# Patient Record
Sex: MALE | URBAN - METROPOLITAN AREA
[De-identification: names, ages, dates, MRNs, and addresses within clinical notes are randomized per-mention and may not be internally consistent; named-entity substitution may affect disease eponyms.]

---

## 2020-04-25 ENCOUNTER — VIRTUAL VISIT (OUTPATIENT)
Dept: FAMILY MEDICINE | Facility: OTHER | Age: 30
End: 2020-04-25

## 2020-04-26 NOTE — PROGRESS NOTES
"Date: 2020 16:00:30  Clinician: Susan Alves  Clinician NPI: [id number]  Patient: Matthew Pryor  Patient : 1990  Patient Address: [address] [place] [address] [place] [address]  Patient Phone: [phone]  Visit Protocol: URI  Patient Summary:  Matthew is a 29 year old ( : 1990 ) male who initiated a Visit for COVID-19 (Coronavirus) evaluation and screening. When asked the question \"Please sign me up to receive news, health information and promotions from [employer].\", Matthew responded \"No\".    Matthew states his symptoms started 1-2 days ago.   His symptoms consist of facial pain or pressure, a sore throat, nasal congestion, malaise, myalgia, and a headache. Matthew also feels feverish.   Symptom details     Nasal secretions: The color of his mucus is clear, white, and yellow.    Sore throat: Matthew reports having mild throat pain (1-3 on a 10 point pain scale), does not have exudate on his tonsils, and can swallow liquids. He is not sure if the lymph nodes in his neck are enlarged. A rash has not appeared on the skin since the sore throat started.     Temperature: His current temperature is 97.4 degrees Fahrenheit.     Facial pain or pressure: The facial pain or pressure feels worse when bending over or leaning forward.     Headache: He states the headache is mild (1-3 on a 10 point pain scale).      Matthew denies having cough, ear pain, rhinitis, wheezing, chills, vomiting, nausea, teeth pain, ageusia, anosmia, and diarrhea. He also denies having recent facial or sinus surgery in the past 60 days. He is not experiencing dyspnea.   Precipitating events  Matthew is not sure if he has been exposed to someone with strep throat. He has not recently been exposed to someone with influenza. Matthew has been in close contact with the following high risk individuals: adults 65 or older, people with asthma, heart disease or diabetes, and immunocompromised people.   Pertinent COVID-19 " (Coronavirus) information  Matthew has not traveled internationally or to the areas where COVID-19 (Coronavirus) is widespread, including cruise ship travel in the last 14 days before the start of his symptoms.   Matthew does not work or volunteer as healthcare worker or a  and does not work or volunteer in a healthcare facility.   He lives with a healthcare worker.   Matthew has not had a close contact with a laboratory-confirmed COVID-19 patient within 14 days of symptom onset. He also has not had a close contact with a suspected COVID-19 patient within 14 days of symptom onset.   Triage Point(s) temporarily suspended for COVID-19 (Coronavirus) screening  Matthew reported the following symptoms which were previously protocol referral points. These protocol referral points have temporarily been removed for purposes of COVID-19 (Coronavirus) screening.   More than 4 sinus infections in the past year   Pertinent medical history  Matthew had 4 or more sinus infections within the past year.   Matthew has taken an antibiotic medication in the past month. Antibiotic details as reported by the patient (free text): Levofloxacin-Sinus Infection   Matthew needs a return to work/school note.   Weight: 260 lbs   Matthew does not smoke or use smokeless tobacco.   Weight: 260 lbs  A synchronous phone visit was initiated by the provider for the following reason: recurrent sinusitis    MEDICATIONS: omeprazole-sodium bicarbonate oral, Allegra-D 24 Hour oral, Vitamin D3 oral, buspirone oral, ALLERGIES: NKDA  Clinician Response:  Dear Matthew,  Based on the information you have provided, you have Allergic Rhinitis commonly called hay fever or seasonal allergy.  Medication information  I am prescribing:     Azelastine 137 mcg (0.1 %) nasal aerosol, spray. Inhale 1-2 sprays in each nostril 2 times per day. There are no refills with this prescription.   Self care  Steps you can take to be as comfortable as possible:      Take a warm shower to loosen congestion    Use a cool-mist humidifier.    Use throat lozenges.    Suck on frozen items such as popsicles.    Drink hot tea with lemon and honey.    Gargle with warm salt water (1/4 teaspoon of salt per 8 ounce glass of water).    Avoid substances you are allergic to.    Try to reduce the amount of humidity in your living and working environments.    Consider moving pets outside of your living and/or working area.    You can decrease your allergy symptoms by staying indoors as much as possible until your allergy season is over.     When to seek care  Please be seen in a clinic or urgent care if any of the following occur:   New symptoms develop, or symptoms become worse   Call ahead before going to the clinic or urgent care.  Call 911 or go to the emergency room if you feel that your throat is closing off, you suddenly develop a rash, you are unable to swallow fluids, you are drooling, or you are having difficulty breathing.  COVID-19 (Coronavirus) General Information  Because there is currently no vaccine to prevent infection, the best way to protect yourself is to avoid being exposed to this virus. Common symptoms of COVID-19 include but are not limited to fever, cough, and shortness of breath. These symptoms appear 2-14 days after you are exposed to the virus that causes COVID-19. Click here for more information from the CDC on how to protect yourself.  If you are sick with COVID-19 or suspect you are infected with the virus that causes COVID-19, follow the steps here from the CDC to help prevent the disease from spreading to people in your home and community.  Click here for general information from the CDC on testing.  If you develop any of these emergency warning signs for COVID-19, get medical attention immediately:     Trouble breathing    Persistent pain or pressure in the chest    New confusion or inability to arouse    Bluish lips or face      Call your doctor or clinic  before going in. Call 911 if you have a medical emergency and notify the  you have or think you may have COVID-19.  For more detailed and up to date information on COVID-19 (Coronavirus), please visit the CDC website.   Diagnosis: Allergic Rhinitis  Diagnosis ICD: J30.9  Triage Notes: I reviewed the patient's history, verified their identity, and explained the Visit process.    history of bad spring allergies.  has stuffiness and sore throat, no respiratory / breathing symptoms-  discussed that this sounds like allergies, not Covid 19.  had sinus surgery delayed due to Covid 19 -  has had repeated sinusitis- had not been responding to amox or augmentin, last treated with levofloxacin.  not aware of past treatment with cefdinir or clindamycin  Synchronous Triage: phone, status: completed, duration: 604 seconds  Prescription: azelastine 137 mcg (0.1 %) nasal aerosol,spray 1 200 spray squeeze bottle, 30 days supply. Inhale 2 sprays in each nostril 2 times per day. Refills: 0, Refill as needed: no, Allow substitutions: yes

## 2024-09-23 ENCOUNTER — HOSPITAL ENCOUNTER (EMERGENCY)
Facility: CLINIC | Age: 34
Discharge: HOME | End: 2024-09-24
Attending: EMERGENCY MEDICINE | Admitting: EMERGENCY MEDICINE
Payer: COMMERCIAL

## 2024-09-23 ENCOUNTER — APPOINTMENT (OUTPATIENT)
Dept: CT IMAGING | Facility: CLINIC | Age: 34
End: 2024-09-23
Attending: EMERGENCY MEDICINE
Payer: COMMERCIAL

## 2024-09-23 DIAGNOSIS — R55 SYNCOPE, UNSPECIFIED SYNCOPE TYPE: ICD-10-CM

## 2024-09-23 LAB
ANION GAP SERPL CALCULATED.3IONS-SCNC: 16 MMOL/L (ref 7–15)
ATRIAL RATE - MUSE: 69 BPM
BASOPHILS # BLD AUTO: 0 10E3/UL (ref 0–0.2)
BASOPHILS NFR BLD AUTO: 0 %
BUN SERPL-MCNC: 16.6 MG/DL (ref 6–20)
CALCIUM SERPL-MCNC: 9.2 MG/DL (ref 8.8–10.4)
CHLORIDE SERPL-SCNC: 101 MMOL/L (ref 98–107)
CREAT SERPL-MCNC: 0.95 MG/DL (ref 0.67–1.17)
D DIMER PPP FEU-MCNC: 1.18 UG/ML FEU (ref 0–0.5)
DIASTOLIC BLOOD PRESSURE - MUSE: NORMAL MMHG
EGFRCR SERPLBLD CKD-EPI 2021: >90 ML/MIN/1.73M2
EOSINOPHIL # BLD AUTO: 0 10E3/UL (ref 0–0.7)
EOSINOPHIL NFR BLD AUTO: 1 %
ERYTHROCYTE [DISTWIDTH] IN BLOOD BY AUTOMATED COUNT: 15.4 % (ref 10–15)
GLUCOSE SERPL-MCNC: 105 MG/DL (ref 70–99)
HCO3 SERPL-SCNC: 25 MMOL/L (ref 22–29)
HCT VFR BLD AUTO: 44.2 % (ref 40–53)
HGB BLD-MCNC: 14.7 G/DL (ref 13.3–17.7)
HOLD SPECIMEN: NORMAL
IMM GRANULOCYTES # BLD: 0 10E3/UL
IMM GRANULOCYTES NFR BLD: 0 %
INTERPRETATION ECG - MUSE: NORMAL
LYMPHOCYTES # BLD AUTO: 1.6 10E3/UL (ref 0.8–5.3)
LYMPHOCYTES NFR BLD AUTO: 23 %
MAGNESIUM SERPL-MCNC: 2.1 MG/DL (ref 1.7–2.3)
MCH RBC QN AUTO: 27.2 PG (ref 26.5–33)
MCHC RBC AUTO-ENTMCNC: 33.3 G/DL (ref 31.5–36.5)
MCV RBC AUTO: 82 FL (ref 78–100)
MONOCYTES # BLD AUTO: 0.4 10E3/UL (ref 0–1.3)
MONOCYTES NFR BLD AUTO: 6 %
NEUTROPHILS # BLD AUTO: 4.9 10E3/UL (ref 1.6–8.3)
NEUTROPHILS NFR BLD AUTO: 70 %
NRBC # BLD AUTO: 0 10E3/UL
NRBC BLD AUTO-RTO: 0 /100
P AXIS - MUSE: 60 DEGREES
PLATELET # BLD AUTO: 178 10E3/UL (ref 150–450)
POTASSIUM SERPL-SCNC: 5.1 MMOL/L (ref 3.4–5.3)
PR INTERVAL - MUSE: 182 MS
QRS DURATION - MUSE: 110 MS
QT - MUSE: 364 MS
QTC - MUSE: 390 MS
R AXIS - MUSE: 17 DEGREES
RBC # BLD AUTO: 5.4 10E6/UL (ref 4.4–5.9)
SODIUM SERPL-SCNC: 142 MMOL/L (ref 135–145)
SYSTOLIC BLOOD PRESSURE - MUSE: NORMAL MMHG
T AXIS - MUSE: 50 DEGREES
TROPONIN T SERPL HS-MCNC: 7 NG/L
VENTRICULAR RATE- MUSE: 69 BPM
WBC # BLD AUTO: 6.9 10E3/UL (ref 4–11)

## 2024-09-23 PROCEDURE — 93005 ELECTROCARDIOGRAM TRACING: CPT | Performed by: EMERGENCY MEDICINE

## 2024-09-23 PROCEDURE — 99285 EMERGENCY DEPT VISIT HI MDM: CPT | Mod: 25

## 2024-09-23 PROCEDURE — 83735 ASSAY OF MAGNESIUM: CPT | Performed by: FAMILY MEDICINE

## 2024-09-23 PROCEDURE — 36415 COLL VENOUS BLD VENIPUNCTURE: CPT | Performed by: EMERGENCY MEDICINE

## 2024-09-23 PROCEDURE — 80048 BASIC METABOLIC PNL TOTAL CA: CPT | Performed by: FAMILY MEDICINE

## 2024-09-23 PROCEDURE — 84484 ASSAY OF TROPONIN QUANT: CPT | Performed by: FAMILY MEDICINE

## 2024-09-23 PROCEDURE — 250N000011 HC RX IP 250 OP 636: Performed by: EMERGENCY MEDICINE

## 2024-09-23 PROCEDURE — 70450 CT HEAD/BRAIN W/O DYE: CPT

## 2024-09-23 PROCEDURE — 71275 CT ANGIOGRAPHY CHEST: CPT

## 2024-09-23 PROCEDURE — 85379 FIBRIN DEGRADATION QUANT: CPT | Performed by: EMERGENCY MEDICINE

## 2024-09-23 PROCEDURE — 85025 COMPLETE CBC W/AUTO DIFF WBC: CPT | Performed by: FAMILY MEDICINE

## 2024-09-23 RX ORDER — IOPAMIDOL 755 MG/ML
90 INJECTION, SOLUTION INTRAVASCULAR ONCE
Status: COMPLETED | OUTPATIENT
Start: 2024-09-24 | End: 2024-09-23

## 2024-09-23 RX ADMIN — IOPAMIDOL 90 ML: 755 INJECTION, SOLUTION INTRAVENOUS at 23:59

## 2024-09-23 ASSESSMENT — COLUMBIA-SUICIDE SEVERITY RATING SCALE - C-SSRS
1. IN THE PAST MONTH, HAVE YOU WISHED YOU WERE DEAD OR WISHED YOU COULD GO TO SLEEP AND NOT WAKE UP?: NO
2. HAVE YOU ACTUALLY HAD ANY THOUGHTS OF KILLING YOURSELF IN THE PAST MONTH?: NO
6. HAVE YOU EVER DONE ANYTHING, STARTED TO DO ANYTHING, OR PREPARED TO DO ANYTHING TO END YOUR LIFE?: NO

## 2024-09-24 ENCOUNTER — ORDERS ONLY (AUTO-RELEASED) (OUTPATIENT)
Dept: CARDIOLOGY | Facility: CLINIC | Age: 34
End: 2024-09-24

## 2024-09-24 ENCOUNTER — OFFICE VISIT (OUTPATIENT)
Dept: CARDIOLOGY | Facility: CLINIC | Age: 34
End: 2024-09-24
Attending: EMERGENCY MEDICINE
Payer: COMMERCIAL

## 2024-09-24 VITALS
WEIGHT: 272 LBS | DIASTOLIC BLOOD PRESSURE: 68 MMHG | OXYGEN SATURATION: 97 % | TEMPERATURE: 97.8 F | SYSTOLIC BLOOD PRESSURE: 113 MMHG | HEART RATE: 62 BPM | HEIGHT: 65 IN | RESPIRATION RATE: 15 BRPM | BODY MASS INDEX: 45.32 KG/M2

## 2024-09-24 VITALS
SYSTOLIC BLOOD PRESSURE: 117 MMHG | RESPIRATION RATE: 14 BRPM | BODY MASS INDEX: 45.26 KG/M2 | HEART RATE: 73 BPM | DIASTOLIC BLOOD PRESSURE: 78 MMHG | WEIGHT: 272 LBS

## 2024-09-24 DIAGNOSIS — R06.09 EXERTIONAL DYSPNEA: ICD-10-CM

## 2024-09-24 DIAGNOSIS — E66.01 CLASS 3 SEVERE OBESITY DUE TO EXCESS CALORIES WITHOUT SERIOUS COMORBIDITY WITH BODY MASS INDEX (BMI) OF 45.0 TO 49.9 IN ADULT (H): ICD-10-CM

## 2024-09-24 DIAGNOSIS — R55 SYNCOPE AND COLLAPSE: ICD-10-CM

## 2024-09-24 DIAGNOSIS — R55 SYNCOPE AND COLLAPSE: Primary | ICD-10-CM

## 2024-09-24 DIAGNOSIS — E66.813 CLASS 3 SEVERE OBESITY DUE TO EXCESS CALORIES WITHOUT SERIOUS COMORBIDITY WITH BODY MASS INDEX (BMI) OF 45.0 TO 49.9 IN ADULT (H): ICD-10-CM

## 2024-09-24 DIAGNOSIS — R00.2 PALPITATIONS: ICD-10-CM

## 2024-09-24 LAB — TROPONIN T SERPL HS-MCNC: 7 NG/L

## 2024-09-24 PROCEDURE — G2211 COMPLEX E/M VISIT ADD ON: HCPCS | Performed by: INTERNAL MEDICINE

## 2024-09-24 PROCEDURE — 36415 COLL VENOUS BLD VENIPUNCTURE: CPT | Performed by: FAMILY MEDICINE

## 2024-09-24 PROCEDURE — 84484 ASSAY OF TROPONIN QUANT: CPT | Performed by: FAMILY MEDICINE

## 2024-09-24 PROCEDURE — 99204 OFFICE O/P NEW MOD 45 MIN: CPT | Performed by: INTERNAL MEDICINE

## 2024-09-24 RX ORDER — PREDNISONE 20 MG/1
TABLET ORAL
COMMUNITY
Start: 2024-09-17

## 2024-09-24 ASSESSMENT — ACTIVITIES OF DAILY LIVING (ADL): ADLS_ACUITY_SCORE: 35

## 2024-09-24 NOTE — PATIENT INSTRUCTIONS
Please contact direct nurses line Monday through Friday 8 AM to 5 PM @ [phone]    After-hours contact cardiology office at [phone].    Plan:    Will contact with results of testing  Keep well hydrated  Good luck with coaching season.

## 2024-09-24 NOTE — PROGRESS NOTES
HEART CARE ENCOUNTER CONSULTATON NOTE      [facility]  [phone]      Assessment/Recommendations   Assessment:   Syncope with collapse. LOC 30 seconds.   Obesity, BMI 45  Exertional dyspnea  Palpitations    Plan:  Recommend complete echocardiogram given syncope and dyspnea exertion  Recommend 14-day Zio patch monitor given syncopal episode, palpitations  Recommend treadmill ECG stress test given syncope and dyspnea exertion.         History of Present Illness/Subjective    HPI: Stevenson Sheehan is a 34 year old male no family history of sudden cardiac death who presents to cardiology clinic in consultation for syncope.    Patient is experienced 2 syncopal episodes in his lifetime.  Both were while he was standing.     Yesterday presented to the emergency department after a syncopal episode.  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  He states he was upset while unlocking the and returned back to his car.  When he returned to his car he had to get out of the car again felt lightheaded followed by a syncopal episode which lasted approximately 30 seconds.  He did not lose any control of his bowel or bladder.  No evidence of seizure.  When he returned to consciousness he was alert.  He felt a little rundown for next couple hours.  Present to the emergency department.  There was noted that he is in sinus rhythm.  Workup was negative for cardiac causes.    Patient did undergo cardiac MRI in 2018 which demonstrated mildly reduced right ventricle at 48%.  Otherwise results of his MRI were otherwise normal.  He had no scarring in the myocardium.  This was for performed at an outside hospital reviewed chart from the patient    Patient denies any lightheadedness episodes while sitting.  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  Denies any anginal chest pain while at work.  He denies orthopnea or PND  symptoms.    Does occasionally feel palpitations for a few beats.  No sustained rapid palpitations    No recent echocardiogram.  No history of stress testing.         Physical Examination  Review of Systems   Vitals: /78 (BP Location: Right arm, Patient Position: Sitting, Cuff Size: Adult Large)   Pulse 73   Resp 14   Wt 123.4 kg (272 lb)   BMI 45.26 kg/m    BMI= Body mass index is 45.26 kg/m .  Wt Readings from Last 3 Encounters:   09/24/24 123.4 kg (272 lb)   09/23/24 123.4 kg (272 lb)        Pleasant, obese   ENT/Mouth: membranes moist, no oral lesions or bleeding gums.      EYES:  no scleral icterus, normal conjunctivae       Chest/Lungs:   lungs are clear to auscultation, no rales or wheezing, no sternal scar, equal chest wall expansion    Cardiovascular:   Regular. Normal first and second heart sounds with no murmurs, rubs, or gallops; the carotid, radial and posterior tibial pulses are intact, Jugular venous pressure normal, no edema bilaterally    Abdomen:  no tenderness; bowel sounds are present   Extremities: no cyanosis or clubbing   Skin: no xanthelasma, warm.    Neurologic: normal  bilateral, no tremors     Psychiatric: alert and oriented x3, calm        Please refer above for cardiac ROS details.        Medical History  Surgical History Family History Social History   No past medical history on file.  No past surgical history on file.  No family history on file.     Social History     Socioeconomic History    Marital status:      Spouse name: Not on file    Number of children: Not on file    Years of education: Not on file    Highest education level: Not on file   Occupational History    Not on file   Tobacco Use    Smoking status: Never     Passive exposure: Past    Smokeless tobacco: Never   Substance and Sexual Activity    Alcohol use: Not Currently    Drug use: Never    Sexual activity: Not on file   Other Topics Concern    Not on file   Social History Narrative    Not on file  "    Social Determinants of Health     Financial Resource Strain: Not on file   Food Insecurity: Not on file   Transportation Needs: Not on file   Physical Activity: Not on file   Stress: Not on file   Social Connections: Not on file   Interpersonal Safety: Not on file   Housing Stability: Not on file           Medications  Allergies   Current Outpatient Medications   Medication Sig Dispense Refill    predniSONE (DELTASONE) 20 MG tablet TAKE 1 TABLET BY MOUTH 1 TO 2 TIMES DAILY WITH A MEAL FOR 5 DAYS         Allergies   Allergen Reactions    Cats     Dust Mites     Dogs Itching and Rash    Trees Rash          Lab Results    Chemistry/lipid CBC Cardiac Enzymes/BNP/TSH/INR   No results for input(s): \"CHOL\", \"HDL\", \"LDL\", \"TRIG\", \"CHOLHDLRATIO\" in the last 84253 hours.  No results for input(s): \"LDL\" in the last 14875 hours.  Recent Labs   Lab Test 09/23/24 2213      POTASSIUM 5.1   CHLORIDE 101   CO2 25   *   BUN 16.6   CR 0.95   GFRESTIMATED >90   LEON 9.2     Recent Labs   Lab Test 09/23/24 2213   CR 0.95     No results for input(s): \"A1C\" in the last 01664 hours.       Recent Labs   Lab Test 09/23/24 2213   WBC 6.9   HGB 14.7   HCT 44.2   MCV 82        Recent Labs   Lab Test 09/23/24 2213   HGB 14.7    No results for input(s): \"TROPONINI\" in the last 51910 hours.  No results for input(s): \"BNP\", \"NTBNPI\", \"NTBNP\" in the last 33887 hours.  No results for input(s): \"TSH\" in the last 43200 hours.  No results for input(s): \"INR\" in the last 72772 hours.     Alfredito Sequeira DO  Cardiologist     52 minutes spent by me on the date of the encounter doing chart review, history and exam, documentation and further activities per the note    The longitudinal plan of care for the diagnosis(es)/condition(s) as documented were addressed during this visit. Due to the added complexity in care, I will continue to support Stevenson in the subsequent management and with ongoing continuity of care.  Will " follow-up on stress testing.  May need further intervention such as further cardiac monitoring.  Discussed if recurrent syncope continues would need implantable loop recorder.

## 2024-09-24 NOTE — ED PROVIDER NOTES
EMERGENCY DEPARTMENT ENCOUNTER      NAME: Stevenson Sheehan  AGE: 34 year old male  YOB: 1990  MRN: [id number]  EVALUATION DATE & TIME: No admission date for patient encounter.    PCP: Kwadwo Vargas    ED PROVIDER: Morteza Aceves D.O.      Chief Complaint   Patient presents with    Syncope       FINAL IMPRESSION:  1. Syncope, unspecified syncope type        ED COURSE & MEDICAL DECISION MAKING:    10:05 PM I met with the patient to gather history and to perform my initial exam. I discussed the plan for care while in the Emergency Department.  11:34 PM Reassessed and updated patient with findings. I notified the patient that they are getting a CT scan.   12:52 AM Reassessed and updated patient with findings. I let patient know that everything looks normal.   12:53 AM Reevaluated and updated the patient with findings. We discussed the plan for discharge and the patient is agreeable. We agreed on a follow up with cardiology. Reviewed supportive cares, symptomatic treatment, outpatient follow up, and reasons to return to the Emergency Department. Patient to be discharged by ED RN.           Pertinent Labs & Imaging studies reviewed. (See chart for details)  34 year old male presents to the Emergency Department for evaluation of syncope.  Initial differential did include PE, arrhythmia, infectious etiology, vasovagal syncope, ACS, other acute process.  EKG did not show any evidence of ischemia arrhythmia, first troponin was negative, and as it was 7, it was repeated which remained at 7.  D-dimer was quite elevated, prompting CT imaging to evaluate for PE.  Fortunately there was no evidence of PE, pneumothorax, pneumonia, or other acute process on his CT scan of the chest.  CT scan of the head was performed as he did hit his head when he lost consciousness.  Fortunately there is no evidence of intracranial bleeding, skull fracture, or other acute injury.  At this time underlying cause of the  patient's symptoms are uncertain as the EKG did not show any evidence of ischemia or arrhythmia.  However I am concerned that it could be arrhythmia genic versus vasovagal, therefore I have referred for rapid access follow-up.  Patient was comfortable with this plan.  Return precautions were discussed.    Medical Decision Making  Obtained supplemental history:Supplemental history obtained?: Family Member/Significant Other  Reviewed external records: External records reviewed?: No  Care impacted by chronic illness:N/A  Care significantly affected by social determinants of health:N/A  Did you consider but not order tests?: Work up considered but not performed and documented in chart, if applicable  Did you interpret images independently?: Independent interpretation of ECG and images noted in documentation, when applicable.  Consultation discussion with other provider:Did you involve another provider (consultant, , pharmacy, etc.)?: No  Discharge. No recommendations on prescription strength medication(s). See documentation for any additional details.  CT Pulmonary Angiogram:The patient had an abnormal d-dimer.  Adult Minor Head Trauma:Loss of consciousness with short term memory deficits        At the conclusion of the encounter I discussed the results of all of the tests and the disposition. The questions were answered. The patient or family acknowledged understanding and was agreeable with the care plan.        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 He says that he began felling lightheaded and the right side of his head started throbbing. He went to his car and the only thing he remembers is waking up on the floor. He says his chest felt achy before the episode. He says his heart was racing prior to the episode but says he  "ran to his car so he believes that's way his heart rate was elevated. He denies any head injuries during football practice. He also reports voice changes and believes he has a sinus infection.           PAST MEDICAL HISTORY:  No past medical history on file.    PAST SURGICAL HISTORY:  No past surgical history on file.      CURRENT MEDICATIONS:    No current facility-administered medications for this encounter.     No current outpatient medications on file.         ALLERGIES:  No Known Allergies    FAMILY HISTORY:  No family history on file.    SOCIAL HISTORY:  Social History     Socioeconomic History    Marital status:        VITALS:  Patient Vitals for the past 24 hrs:   BP Temp Temp src Pulse Resp SpO2 Height Weight   09/24/24 0110 113/68 -- -- 62 15 97 % -- --   09/23/24 2345 -- -- -- 61 12 98 % -- --   09/23/24 2335 134/73 -- -- 63 -- 97 % -- --   09/23/24 2055 134/73 97.8  F (36.6  C) Temporal 73 18 96 % 1.651 m (5' 5\") 123.4 kg (272 lb)       PHYSICAL EXAM    VITAL SIGNS: /68   Pulse 62   Temp 97.8  F (36.6  C) (Temporal)   Resp 15   Ht 1.651 m (5' 5\")   Wt 123.4 kg (272 lb)   SpO2 97%   BMI 45.26 kg/m      General Appearance: Well-appearing, well-nourished, no acute distress   Head:  Normocephalic, without obvious abnormality, atraumatic  Eyes:  PERRL, conjunctiva/corneas clear, EOM's intact,  ENT:  Lips, mucosa, and tongue normal, membranes are moist without pallor  Neck:  Normal ROM, symmetrical, trachea midline    Cardio:  Regular rate and rhythm, no murmur, rub or gallop, 2+ pulses symmetric in all extremities  Pulm:  Clear to auscultation bilaterally, respirations unlabored,  Abdomen:  Soft, non-tender, no rebound or guarding.  Musculoskeletal: Full ROM, no edema, no cyanosis, good ROM of major joints  Integument:  Warm, Dry, No erythema, No rash.    Neurologic:  Alert & oriented.  No focal deficits appreciated.    Psychiatric:  Affect normal, Judgment normal, Mood normal.  "     LABS  Results for orders placed or performed during the hospital encounter of 09/23/24 (from the past 24 hour(s))   ECG 12-LEAD WITH MUSE (LHE)   Result Value Ref Range    Systolic Blood Pressure  mmHg    Diastolic Blood Pressure  mmHg    Ventricular Rate 69 BPM    Atrial Rate 69 BPM    IA Interval 182 ms    QRS Duration 110 ms     ms    QTc 390 ms    P Axis 60 degrees    R AXIS 17 degrees    T Axis 50 degrees    Interpretation ECG       Sinus rhythm  Normal ECG  No previous ECGs available  Confirmed by SEE ED PROVIDER NOTE FOR, ECG INTERPRETATION ([id number]),  Christina Laird ([id number]) on 9/23/2024 10:56:25 PM     CBC with platelets differential    Narrative    The following orders were created for panel order CBC with platelets differential.  Procedure                               Abnormality         Status                     ---------                               -----------         ------                     CBC with platelets and d...[[id number]]  Abnormal            Final result                 Please view results for these tests on the individual orders.   Basic metabolic panel   Result Value Ref Range    Sodium 142 135 - 145 mmol/L    Potassium 5.1 3.4 - 5.3 mmol/L    Chloride 101 98 - 107 mmol/L    Carbon Dioxide (CO2) 25 22 - 29 mmol/L    Anion Gap 16 (H) 7 - 15 mmol/L    Urea Nitrogen 16.6 6.0 - 20.0 mg/dL    Creatinine 0.95 0.67 - 1.17 mg/dL    GFR Estimate >90 >60 mL/min/1.73m2    Calcium 9.2 8.8 - 10.4 mg/dL    Glucose 105 (H) 70 - 99 mg/dL   Troponin T, High Sensitivity   Result Value Ref Range    Troponin T, High Sensitivity 7 <=22 ng/L   Magnesium   Result Value Ref Range    Magnesium 2.1 1.7 - 2.3 mg/dL   CBC with platelets and differential   Result Value Ref Range    WBC Count 6.9 4.0 - 11.0 10e3/uL    RBC Count 5.40 4.40 - 5.90 10e6/uL    Hemoglobin 14.7 13.3 - 17.7 g/dL    Hematocrit 44.2 40.0 - 53.0 %    MCV 82 78 - 100 fL    MCH 27.2 26.5 - 33.0 pg    MCHC 33.3 31.5 - 36.5 g/dL    RDW  15.4 (H) 10.0 - 15.0 %    Platelet Count 178 150 - 450 10e3/uL    % Neutrophils 70 %    % Lymphocytes 23 %    % Monocytes 6 %    % Eosinophils 1 %    % Basophils 0 %    % Immature Granulocytes 0 %    NRBCs per 100 WBC 0 <1 /100    Absolute Neutrophils 4.9 1.6 - 8.3 10e3/uL    Absolute Lymphocytes 1.6 0.8 - 5.3 10e3/uL    Absolute Monocytes 0.4 0.0 - 1.3 10e3/uL    Absolute Eosinophils 0.0 0.0 - 0.7 10e3/uL    Absolute Basophils 0.0 0.0 - 0.2 10e3/uL    Absolute Immature Granulocytes 0.0 <=0.4 10e3/uL    Absolute NRBCs 0.0 10e3/uL   D dimer quantitative   Result Value Ref Range    D-Dimer Quantitative 1.18 (H) 0.00 - 0.50 ug/mL FEU    Narrative    This D-dimer assay is intended for use in conjunction with a clinical pretest probability assessment model to exclude pulmonary embolism (PE) and deep venous thrombosis (DVT) in outpatients suspected of PE or DVT. The cut-off value is 0.50 ug/mL FEU.   [place] Draw *Canceled*    Narrative    The following orders were created for panel order [place] Draw.  Procedure                               Abnormality         Status                     ---------                               -----------         ------                     Extra Green Top (Lithium...[[id number]]                                                   Please view results for these tests on the individual orders.   Extra Tube    Narrative    The following orders were created for panel order Extra Tube.  Procedure                               Abnormality         Status                     ---------                               -----------         ------                     Extra Heparinized Syringe[[id number]]                        Final result                 Please view results for these tests on the individual orders.   Extra Heparinized Syringe   Result Value Ref Range    Hold Specimen JIC    [place] Draw *Canceled*    Narrative    The following orders were created for panel order [place] Draw.  Procedure                                Abnormality         Status                     ---------                               -----------         ------                       Please view results for these tests on the individual orders.   Head CT w/o contrast    Narrative    EXAM: CT HEAD W/O CONTRAST  LOCATION: [facility]  DATE: 9/23/2024    INDICATION: Trauma  COMPARISON: None.  TECHNIQUE: Routine CT Head without IV contrast. Multiplanar reformats. Dose reduction techniques were used.    FINDINGS:  INTRACRANIAL CONTENTS: No intracranial hemorrhage, extraaxial collection, or mass effect.  No CT evidence of acute infarct. Normal parenchymal attenuation. Normal ventricles and sulci.     VISUALIZED ORBITS/SINUSES/MASTOIDS: No intraorbital abnormality. No paranasal sinus mucosal disease. No middle ear or mastoid effusion.    BONES/SOFT TISSUES: No acute abnormality.      Impression    IMPRESSION:  1.  Normal head CT.   CT Chest Pulmonary Embolism w Contrast    Narrative    EXAM: CT CHEST PULMONARY EMBOLISM W CONTRAST  LOCATION: [facility]  DATE: 9/23/2024    INDICATION: Syncope, elevated dimer, rule out PE  COMPARISON: None.  TECHNIQUE: CT chest pulmonary angiogram during arterial phase injection of IV contrast. Multiplanar reformats and MIP reconstructions were performed. Dose reduction techniques were used.   CONTRAST: Isovue 370 90mL    FINDINGS:  ANGIOGRAM CHEST: Pulmonary arteries are normal caliber and negative for pulmonary emboli. Thoracic aorta is negative for dissection. No CT evidence of right heart strain.    LUNGS AND PLEURA: The lungs are clear. No infiltrates or effusions.    MEDIASTINUM/AXILLAE: Normal. No adenopathy.    CORONARY ARTERY CALCIFICATION: None.    UPPER ABDOMEN: Normal.    MUSCULOSKELETAL: Unremarkable.      Impression    IMPRESSION:  1.  No evidence for pulmonary emboli.  2.  No evidence for acute pulmonary disease.   Troponin T, High Sensitivity    Result Value Ref Range    Troponin T, High Sensitivity 7 <=22 ng/L         RADIOLOGY  CT Chest Pulmonary Embolism w Contrast   Final Result   IMPRESSION:   1.  No evidence for pulmonary emboli.   2.  No evidence for acute pulmonary disease.      Head CT w/o contrast   Final Result   IMPRESSION:   1.  Normal head CT.             EKG:    Rate: 69 bpm  Rhythm: Normal Sinus Rhythm  Axis: Normal  Interval: Normal  Conduction: Normal  QRS: Normal  ST: Normal  T-wave: Normal  QT: Not prolonged  Comparison EKG: no previous available for comparison  Impression:  No acute ischemic change   I have independently reviewed and interpreted today's EKG, pending Cardiologist read          MEDICATIONS GIVEN IN THE EMERGENCY:  Medications   iopamidol (ISOVUE-370) solution 90 mL (90 mLs Intravenous $Given 9/23/24 1637)       NEW PRESCRIPTIONS STARTED AT TODAY'S ER VISIT  There are no discharge medications for this patient.       I, Ernst Huffman, am serving as a scribe to document services personally performed by Morteza Aceves D.O., based on my observations and the provider's statements to me.  I, Morteza Aceves D.O., attest that Ernst Patel is acting in a scribe capacity, has observed my performance of the services and has documented them in accordance with my direction.     Morteza Aceves D.O.  Emergency Medicine  [facility] EMERGENCY ROOM  [address] [address] [phone]  Dept: [phone]       Morteza Aceves,   09/24/24 0149

## 2024-09-24 NOTE — LETTER
9/24/2024    Georgiana Belle MD  [address]  [place] [address]    RE: Stevenson Sheehan       Dear Colleague,     I had the pleasure of seeing Stevenson Sheehan in the [facility].    HEART CARE ENCOUNTER CONSULTATON NOTE      [facility]  [phone]      Assessment/Recommendations   Assessment:   Syncope with collapse. LOC 30 seconds.   Obesity, BMI 45  Exertional dyspnea  Palpitations    Plan:  Recommend complete echocardiogram given syncope and dyspnea exertion  Recommend 14-day Zio patch monitor given syncopal episode, palpitations  Recommend treadmill ECG stress test given syncope and dyspnea exertion.         History of Present Illness/Subjective    HPI: Stevenson Sheehan is a 34 year old male no family history of sudden cardiac death who presents to cardiology clinic in consultation for syncope.    Patient is experienced 2 syncopal episodes in his lifetime.  Both were while he was standing.     Yesterday presented to the emergency department after a syncopal episode.  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  He states he was upset while unlocking the and returned back to his car.  When he returned to his car he had to get out of the car again felt lightheaded followed by a syncopal episode which lasted approximately 30 seconds.  He did not lose any control of his bowel or bladder.  No evidence of seizure.  When he returned to consciousness he was alert.  He felt a little rundown for next couple hours.  Present to the emergency department.  There was noted that he is in sinus rhythm.  Workup was negative for cardiac causes.    Patient did undergo cardiac MRI in 2018 which demonstrated mildly reduced right ventricle at 48%.  Otherwise results of his MRI were otherwise normal.  He had no scarring in the myocardium.  This was for performed at an outside hospital  reviewed chart from the patient    Patient denies any lightheadedness episodes while sitting.  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  Denies any anginal chest pain while at work.  He denies orthopnea or PND symptoms.    Does occasionally feel palpitations for a few beats.  No sustained rapid palpitations    No recent echocardiogram.  No history of stress testing.         Physical Examination  Review of Systems   Vitals: /78 (BP Location: Right arm, Patient Position: Sitting, Cuff Size: Adult Large)   Pulse 73   Resp 14   Wt 123.4 kg (272 lb)   BMI 45.26 kg/m    BMI= Body mass index is 45.26 kg/m .  Wt Readings from Last 3 Encounters:   09/24/24 123.4 kg (272 lb)   09/23/24 123.4 kg (272 lb)        Pleasant, obese   ENT/Mouth: membranes moist, no oral lesions or bleeding gums.      EYES:  no scleral icterus, normal conjunctivae       Chest/Lungs:   lungs are clear to auscultation, no rales or wheezing, no sternal scar, equal chest wall expansion    Cardiovascular:   Regular. Normal first and second heart sounds with no murmurs, rubs, or gallops; the carotid, radial and posterior tibial pulses are intact, Jugular venous pressure normal, no edema bilaterally    Abdomen:  no tenderness; bowel sounds are present   Extremities: no cyanosis or clubbing   Skin: no xanthelasma, warm.    Neurologic: normal  bilateral, no tremors     Psychiatric: alert and oriented x3, calm        Please refer above for cardiac ROS details.        Medical History  Surgical History Family History Social History   No past medical history on file.  No past surgical history on file.  No family history on file.     Social History     Socioeconomic History     Marital status:      Spouse name: Not on file     Number of children: Not on file     Years of education: Not on file     Highest education level: Not on file   Occupational History     Not on file   Tobacco Use     Smoking status: Never     Passive exposure:  "Past     Smokeless tobacco: Never   Substance and Sexual Activity     Alcohol use: Not Currently     Drug use: Never     Sexual activity: Not on file   Other Topics Concern     Not on file   Social History Narrative     Not on file     Social Determinants of Health     Financial Resource Strain: Not on file   Food Insecurity: Not on file   Transportation Needs: Not on file   Physical Activity: Not on file   Stress: Not on file   Social Connections: Not on file   Interpersonal Safety: Not on file   Housing Stability: Not on file           Medications  Allergies   Current Outpatient Medications   Medication Sig Dispense Refill     predniSONE (DELTASONE) 20 MG tablet TAKE 1 TABLET BY MOUTH 1 TO 2 TIMES DAILY WITH A MEAL FOR 5 DAYS         Allergies   Allergen Reactions     Cats      Dust Mites      Dogs Itching and Rash     Trees Rash          Lab Results    Chemistry/lipid CBC Cardiac Enzymes/BNP/TSH/INR   No results for input(s): \"CHOL\", \"HDL\", \"LDL\", \"TRIG\", \"CHOLHDLRATIO\" in the last 34008 hours.  No results for input(s): \"LDL\" in the last 06291 hours.  Recent Labs   Lab Test 09/23/24  2213      POTASSIUM 5.1   CHLORIDE 101   CO2 25   *   BUN 16.6   CR 0.95   GFRESTIMATED >90   LEON 9.2     Recent Labs   Lab Test 09/23/24  2213   CR 0.95     No results for input(s): \"A1C\" in the last 05430 hours.       Recent Labs   Lab Test 09/23/24 2213   WBC 6.9   HGB 14.7   HCT 44.2   MCV 82        Recent Labs   Lab Test 09/23/24 2213   HGB 14.7    No results for input(s): \"TROPONINI\" in the last 80665 hours.  No results for input(s): \"BNP\", \"NTBNPI\", \"NTBNP\" in the last 48717 hours.  No results for input(s): \"TSH\" in the last 71991 hours.  No results for input(s): \"INR\" in the last 24203 hours.     Alfredito Sequeira DO  Cardiologist     52 minutes spent by me on the date of the encounter doing chart review, history and exam, documentation and further activities per the note    The longitudinal plan of " care for the diagnosis(es)/condition(s) as documented were addressed during this visit. Due to the added complexity in care, I will continue to support Stevenson in the subsequent management and with ongoing continuity of care.  Will follow-up on stress testing.  May need further intervention such as further cardiac monitoring.  Discussed if recurrent syncope continues would need implantable loop recorder.                                 Thank you for allowing me to participate in the care of your patient.      Sincerely,     Alfredito Sequeira DO     [facility]  cc:   Morteza Aceves DO  [address] [place] [id number]

## 2024-09-24 NOTE — ED TRIAGE NOTES
Patient arrives to the ER after a syncopal episode. Patient had a syncopal episode around 4889-3835 at [facility] practice.  He states that this has happened to him once before about 2 years ago, but he was never evaluated for it. Fatigued in triage.      Triage Assessment (Adult)       Row Name 09/23/24 2057          Triage Assessment    Airway WDL WDL        Respiratory WDL    Respiratory WDL WDL        Skin Circulation/Temperature WDL    Skin Circulation/Temperature WDL WDL        Cardiac WDL    Cardiac WDL WDL        Peripheral/Neurovascular WDL    Peripheral Neurovascular WDL WDL        Cognitive/Neuro/Behavioral WDL    Cognitive/Neuro/Behavioral WDL WDL

## 2024-09-29 ENCOUNTER — HEALTH MAINTENANCE LETTER (OUTPATIENT)
Age: 34
End: 2024-09-29

## 2024-10-03 ENCOUNTER — TELEPHONE (OUTPATIENT)
Dept: CARDIOLOGY | Facility: CLINIC | Age: 34
End: 2024-10-03
Payer: COMMERCIAL

## 2024-10-03 NOTE — TELEPHONE ENCOUNTER
Attempted to call pt but was unable to reach. LVM with direct call back number.     Noted plan was as follows at last OV with Dr. Sequeira:   1. Recommend complete echocardiogram given syncope and dyspnea exertion  2. Recommend 14-day Zio patch monitor given syncopal episode, palpitations  3. Recommend treadmill ECG stress test given syncope and dyspnea exertion.     Noted ECHO and Stress ECG sched for 10/4/24. aj

## 2024-10-04 ENCOUNTER — HOSPITAL ENCOUNTER (OUTPATIENT)
Dept: CARDIOLOGY | Facility: CLINIC | Age: 34
Discharge: HOME | End: 2024-10-04
Attending: INTERNAL MEDICINE
Payer: COMMERCIAL

## 2024-10-04 DIAGNOSIS — R00.2 PALPITATIONS: ICD-10-CM

## 2024-10-04 DIAGNOSIS — R55 SYNCOPE AND COLLAPSE: ICD-10-CM

## 2024-10-04 LAB
CV STRESS CURRENT BP HE: NORMAL
CV STRESS CURRENT HR HE: 102
CV STRESS CURRENT HR HE: 109
CV STRESS CURRENT HR HE: 112
CV STRESS CURRENT HR HE: 112
CV STRESS CURRENT HR HE: 118
CV STRESS CURRENT HR HE: 119
CV STRESS CURRENT HR HE: 129
CV STRESS CURRENT HR HE: 134
CV STRESS CURRENT HR HE: 140
CV STRESS CURRENT HR HE: 142
CV STRESS CURRENT HR HE: 144
CV STRESS CURRENT HR HE: 162
CV STRESS CURRENT HR HE: 162
CV STRESS CURRENT HR HE: 168
CV STRESS CURRENT HR HE: 169
CV STRESS CURRENT HR HE: 171
CV STRESS CURRENT HR HE: 68
CV STRESS CURRENT HR HE: 80
CV STRESS CURRENT HR HE: 82
CV STRESS CURRENT HR HE: 84
CV STRESS CURRENT HR HE: 84
CV STRESS CURRENT HR HE: 89
CV STRESS CURRENT HR HE: 91
CV STRESS CURRENT HR HE: 92
CV STRESS CURRENT HR HE: 97
CV STRESS DEVIATION TIME HE: NORMAL
CV STRESS ECHO PERCENT HR HE: NORMAL
CV STRESS EXERCISE STAGE HE: NORMAL
CV STRESS EXERCISE STAGE REACHED HE: NORMAL
CV STRESS FINAL RESTING BP HE: NORMAL
CV STRESS FINAL RESTING HR HE: 84
CV STRESS MAX HR HE: 170
CV STRESS MAX TREADMILL GRADE HE: 14
CV STRESS MAX TREADMILL SPEED HE: 3.4
CV STRESS PEAK DIA BP HE: NORMAL
CV STRESS PEAK SYS BP HE: NORMAL
CV STRESS PHASE HE: NORMAL
CV STRESS PROTOCOL HE: NORMAL
CV STRESS REASON STOPPED HE: NORMAL
CV STRESS RESTING PT POSITION HE: NORMAL
CV STRESS RESTING PT POSITION HE: NORMAL
CV STRESS ST DEVIATION AMOUNT HE: NORMAL
CV STRESS ST DEVIATION ELEVATION HE: NORMAL
CV STRESS ST EVELATION AMOUNT HE: NORMAL
CV STRESS SYMPTOMS HE: NORMAL
CV STRESS TEST TYPE HE: NORMAL
CV STRESS TOTAL STAGE TIME MIN 1 HE: NORMAL
STRESS ECHO BASELINE DIASTOLIC HE: 83
STRESS ECHO BASELINE HR: 69
STRESS ECHO BASELINE SYSTOLIC BP: 126
STRESS ECHO LAST STRESS DIASTOLIC BP: 70
STRESS ECHO LAST STRESS HR: 169
STRESS ECHO LAST STRESS SYSTOLIC BP: 146
STRESS ECHO POST ESTIMATED WORKLOAD: 8.9
STRESS ECHO POST EXERCISE DUR MIN: 7
STRESS ECHO POST EXERCISE DUR SEC: 0
STRESS ECHO TARGET HR: 160

## 2024-10-04 PROCEDURE — 93016 CV STRESS TEST SUPVJ ONLY: CPT | Performed by: INTERNAL MEDICINE

## 2024-10-04 PROCEDURE — 93018 CV STRESS TEST I&R ONLY: CPT | Performed by: INTERNAL MEDICINE

## 2024-10-04 PROCEDURE — 93017 CV STRESS TEST TRACING ONLY: CPT

## 2024-10-04 PROCEDURE — 93306 TTE W/DOPPLER COMPLETE: CPT | Mod: 26 | Performed by: INTERNAL MEDICINE

## 2024-10-04 PROCEDURE — 93306 TTE W/DOPPLER COMPLETE: CPT

## 2024-10-07 ENCOUNTER — TELEPHONE (OUTPATIENT)
Dept: CARDIOLOGY | Facility: CLINIC | Age: 34
End: 2024-10-07
Payer: COMMERCIAL

## 2024-10-07 NOTE — TELEPHONE ENCOUNTER
Noted. Made two attempts to speak with patient last week but was unable to reach. LVM x2 with direct callback number. Noted that [facility] msg sent by pt today 10/7/24 regarding Zio. Please refer to that encounter for further follow-up. aj

## 2025-06-09 ENCOUNTER — ANCILLARY PROCEDURE (OUTPATIENT)
Dept: GENERAL RADIOLOGY | Facility: CLINIC | Age: 35
End: 2025-06-09
Attending: NURSE PRACTITIONER
Payer: COMMERCIAL

## 2025-06-09 ENCOUNTER — OFFICE VISIT (OUTPATIENT)
Dept: INTERNAL MEDICINE | Facility: CLINIC | Age: 35
End: 2025-06-09
Payer: COMMERCIAL

## 2025-06-09 VITALS
RESPIRATION RATE: 16 BRPM | TEMPERATURE: 97.6 F | OXYGEN SATURATION: 95 % | WEIGHT: 264 LBS | BODY MASS INDEX: 42.43 KG/M2 | DIASTOLIC BLOOD PRESSURE: 82 MMHG | HEART RATE: 69 BPM | SYSTOLIC BLOOD PRESSURE: 120 MMHG | HEIGHT: 66 IN

## 2025-06-09 DIAGNOSIS — R73.03 PREDIABETES: ICD-10-CM

## 2025-06-09 DIAGNOSIS — Z96.643 HX OF BILATERAL HIP REPLACEMENTS: ICD-10-CM

## 2025-06-09 DIAGNOSIS — Z87.768 PERSONAL HISTORY OF CONGENITAL HIP DYSPLASIA: ICD-10-CM

## 2025-06-09 DIAGNOSIS — Z13.6 SCREENING FOR CARDIOVASCULAR CONDITION: ICD-10-CM

## 2025-06-09 DIAGNOSIS — J30.89 ENVIRONMENTAL AND SEASONAL ALLERGIES: ICD-10-CM

## 2025-06-09 DIAGNOSIS — Z82.3 FAMILY HISTORY OF CVA: ICD-10-CM

## 2025-06-09 DIAGNOSIS — Z00.00 ENCOUNTER FOR ANNUAL PHYSICAL EXAM: Primary | ICD-10-CM

## 2025-06-09 PROCEDURE — 3079F DIAST BP 80-89 MM HG: CPT | Performed by: NURSE PRACTITIONER

## 2025-06-09 PROCEDURE — 99385 PREV VISIT NEW AGE 18-39: CPT | Performed by: NURSE PRACTITIONER

## 2025-06-09 PROCEDURE — 73522 X-RAY EXAM HIPS BI 3-4 VIEWS: CPT | Mod: TC | Performed by: RADIOLOGY

## 2025-06-09 PROCEDURE — 99213 OFFICE O/P EST LOW 20 MIN: CPT | Mod: 25 | Performed by: NURSE PRACTITIONER

## 2025-06-09 PROCEDURE — G2211 COMPLEX E/M VISIT ADD ON: HCPCS | Performed by: NURSE PRACTITIONER

## 2025-06-09 PROCEDURE — 3074F SYST BP LT 130 MM HG: CPT | Performed by: NURSE PRACTITIONER

## 2025-06-09 RX ORDER — BUDESONIDE 1 MG/2ML
INHALANT ORAL
COMMUNITY

## 2025-06-09 RX ORDER — ALBUTEROL SULFATE 90 UG/1
INHALANT RESPIRATORY (INHALATION)
COMMUNITY
Start: 2024-09-09

## 2025-06-09 RX ORDER — AZELASTINE 1 MG/ML
SPRAY, METERED NASAL
COMMUNITY
Start: 2024-12-30

## 2025-06-09 SDOH — HEALTH STABILITY: PHYSICAL HEALTH: ON AVERAGE, HOW MANY DAYS PER WEEK DO YOU ENGAGE IN MODERATE TO STRENUOUS EXERCISE (LIKE A BRISK WALK)?: 6 DAYS

## 2025-06-09 SDOH — HEALTH STABILITY: PHYSICAL HEALTH: ON AVERAGE, HOW MANY MINUTES DO YOU ENGAGE IN EXERCISE AT THIS LEVEL?: 110 MIN

## 2025-06-09 ASSESSMENT — SOCIAL DETERMINANTS OF HEALTH (SDOH): HOW OFTEN DO YOU GET TOGETHER WITH FRIENDS OR RELATIVES?: ONCE A WEEK

## 2025-06-09 NOTE — PATIENT INSTRUCTIONS
Xrays of hip today.     Return for fasting labs.     We will review your records to determine appropriate screening guidelines for other screenings.     Eat a quality diet (generally, low in simple sugars, starches, cholesterol and saturated fat.)    Exercise regularly. Ideally you would have 30 minutes of aerobic exercise at least 4 times weekly. Find something you enjoy and a friend to do it with you.    Apply sun block (SPF 25 or greater) on exposed skin anytime you are out in the sun to prevent skin cancer.     Do testicular self-exam once monthly. Schedule an appointment if you find a nodule on one of the testicles or have another finding that concerns you.    Wear a seatbelt whenever you are in a car.    Consider a flu shot every fall.    Follow up in one year, return earlier if needed

## 2025-06-09 NOTE — PROGRESS NOTES
Preventive Care Visit  [facility] INDY Crain NP, Internal Medicine  Jun 9, 2025      Assessment & Plan     Encounter for annual physical exam  Reviewed overall health maintenance.  Screening labs as noted below.  Patient states that he was getting other screenings done through his previous clinic however we discussed that some of these would not be routine screening guidelines so we will wait getting records from his previous clinic to determine if they were monitoring anything specific.  We reviewed proper screening guidelines.  Due for screening for colorectal cancer to start at age 47.    - REVIEW OF HEALTH MAINTENANCE PROTOCOL ORDERS  - CBC with platelets; Future  - Comprehensive metabolic panel; Future    Prediabetes  Patient reports a history of prediabetes.  Will check an A1c today.  - Hemoglobin A1c; Future    Environmental and seasonal allergies  Patient has a history of environmental and seasonal allergies.  Currently sees an allergist through [facility] and gets allergy injections.  Is looking to transition to [facility] within the next several months.  Referral placed at this time to facilitate scheduling.  - Adult Allergy/Asthma  Referral; Future    Screening for cardiovascular condition  Patient reports father had a heart attack in his 50s.  Patient unsure when his last cholesterol level was checked.  We discussed the importance of getting a fasting cholesterol.  Patient will return when fasting for labs.  He states in the past his previous clinic he was getting EKGs to monitor for cardiovascular condition but has not previously had an abnormal EKG himself.  At this time we discussed that EKGs are not typically used for routine screening guidelines and he is not have any symptoms at this time to indicate need for an EKG today.  - Lipid Profile (Chol, Trig, HDL, LDL calc); Future    Hx of bilateral hip replacements  Personal history of congenital hip  "dysplasia  Patient states that he has been getting x-rays of his hips every 2 to 3 years after bilateral hip replacement due to congenital hip dysplasia.  Last x-ray was in 2022.  Will get bilateral hip x-ray today.  He does not continue to follow any orthopedic specialty.  Hip replacement was done at [facility].   - XR Hip Bilateral 2 Views Each; Future    Family history of CVA  Patient reports a history of a grandfather and uncles who have had brain aneurysm.  No first-degree relatives with a history of CVA noted.  States in the past she has had CTAs of his head to monitor for screening purposes.  However we did look in up-to-date at screening guidelines and it is recommended with asymptomatic individuals with a family history of first-degree relatives to do screening yearly for 3 years then every 5 years.  Discussed with patient that yearly ongoing screening would not be indicated for him and that his family history does not include first-degree relatives with a specific history.  We discussed first-degree relatives of parents, full siblings and any children.  Awaiting record transfer from [facility] to see if there was anything specifically that they were monitoring for the patient otherwise is unlikely he will need continued CTs or MRIs for screening given he does not meet criteria per up-to-date guidelines.    Patient has been advised of split billing requirements and indicates understanding: Yes        BMI  Estimated body mass index is 42.94 kg/m  as calculated from the following:    Height as of this encounter: 1.67 m (5' 5.75\").    Weight as of this encounter: 119.7 kg (264 lb).   Weight management plan: Discussed healthy diet and exercise guidelines    Counseling  Appropriate preventive services were addressed with this patient via screening, questionnaire, or discussion as appropriate for fall prevention, nutrition, physical activity, Tobacco-use cessation, social engagement, weight loss and cognition. "  Checklist reviewing preventive services available has been given to the patient.  Reviewed patient's diet, addressing concerns and/or questions.   He is at risk for psychosocial distress and has been provided with information to reduce risk.           Man Gamino is a 34 year old, presenting for the following:  Physical (Would like to establish care)        6/9/2025     3:18 PM   Additional Questions   Roomed by Alize AGUILAR  Stevenson is a pleasant 34-year-old male here today for an annual exam.  He is  to his wife Domenica and has 1 child on the way as she is due in January 2026.  He works as a health teacher/.  He had a list of a lot of different screening testing that he states were being done quite frequently due to some family history.  We did update his family history today in the health history.  We did discuss appropriate screening guidelines given this specific history for him.         Advance Care Planning    Discussed advance care planning with patient; however, patient declined at this time.        6/9/2025   General Health   How would you rate your overall physical health? (!) FAIR   Feel stress (tense, anxious, or unable to sleep) To some extent   (!) STRESS CONCERN      6/9/2025   Nutrition   Three or more servings of calcium each day? Yes   Diet: Gluten-free/reduced    Other   If other, please elaborate: paleo/keto   How many servings of fruit and vegetables per day? (!) 0-1   How many sweetened beverages each day? 0-1       Multiple values from one day are sorted in reverse-chronological order         6/9/2025   Exercise   Days per week of moderate/strenous exercise 6 days   Average minutes spent exercising at this level 110 min         6/9/2025   Social Factors   Frequency of gathering with friends or relatives Once a week   Worry food won't last until get money to buy more No   Food not last or not have enough money for food? No   Do you have housing? (Housing is  "defined as stable permanent housing and does not include staying outside in a car, in a tent, in an abandoned building, in an overnight shelter, or couch-surfing.) Yes   Are you worried about losing your housing? No   Lack of transportation? No   Unable to get utilities (heat,electricity)? No         6/9/2025   Dental   Dentist two times every year? Yes         Today's PHQ-2 Score:       6/9/2025     3:19 PM   PHQ-2 ( 1999 [employer])   Q1: Little interest or pleasure in doing things 0   Q2: Feeling down, depressed or hopeless 0   PHQ-2 Score 0    Q1: Little interest or pleasure in doing things Not at all   Q2: Feeling down, depressed or hopeless Not at all   PHQ-2 Score 0       Patient-reported           6/9/2025   Substance Use   Alcohol more than 3/day or more than 7/wk No   Do you use any other substances recreationally? No     Social History     Tobacco Use    Smoking status: Never     Passive exposure: Past    Smokeless tobacco: Never   Vaping Use    Vaping status: Never Used   Substance Use Topics    Alcohol use: Not Currently    Drug use: Never             6/9/2025   One time HIV Screening   Previous HIV test? Yes         6/9/2025   STI Screening   New sexual partner(s) since last STI/HIV test? No         6/9/2025   Contraception/Family Planning   Questions about contraception or family planning No        Reviewed and updated as needed this visit by Provider                  ROS  Comprehensive 12-point review of systems was completed and negative except as noted in HPI.       Objective    Exam  /82 (BP Location: Right arm, Patient Position: Sitting)   Pulse 69   Temp 97.6  F (36.4  C)   Resp 16   Ht 1.67 m (5' 5.75\")   Wt 119.7 kg (264 lb)   SpO2 95%   BMI 42.94 kg/m     Estimated body mass index is 42.94 kg/m  as calculated from the following:    Height as of this encounter: 1.67 m (5' 5.75\").    Weight as of this encounter: 119.7 kg (264 lb).    Physical Exam  Constitutional: In no acute distress. "  Clean appearance.  Ears: Bilateral TMs are intact without any erythema or effusion.  Grossly normal hearing.  Oropharynx: Normal mucosa.  Dentition and gingiva is appropriate.  Posterior oropharynx without any abnormalities.  Neck: Supple.  Trachea is midline.  No thyromegaly.  Neck is without tenderness or masses.  Cardiovascular: Regular rate and rhythm.  Normal peripheral perfusion.  No edema.   Respiratory: Lungs are clear bilaterally.  Normal respiratory effort.  Skin: Skin is pink, warm and dry.  Gastrointestinal: Soft and flat.  Normal bowel sounds.  Nontender throughout upon palpation.  No organomegaly or masses.  Negative for CVA tenderness.  Genitourinary: Deferred.  No concerns.  Musculoskeletal:  Normal range of motion of extremities.  Gait normal.  Able to mount exam table without difficulties.  Psychiatric: Appropriate affect and demeanor.  Memory intact.  Good insight and judgment.  Neurologic: Sensation and temperature of extremities appropriate.  No tremor or involuntary movement noted.          Signed Electronically by: Afia Crain NP

## 2025-06-10 ENCOUNTER — RESULTS FOLLOW-UP (OUTPATIENT)
Dept: INTERNAL MEDICINE | Facility: CLINIC | Age: 35
End: 2025-06-10

## 2025-06-10 ENCOUNTER — PATIENT OUTREACH (OUTPATIENT)
Dept: CARE COORDINATION | Facility: CLINIC | Age: 35
End: 2025-06-10

## 2025-06-11 ENCOUNTER — LAB (OUTPATIENT)
Dept: LAB | Facility: CLINIC | Age: 35
End: 2025-06-11
Payer: COMMERCIAL

## 2025-06-11 DIAGNOSIS — R73.03 PREDIABETES: ICD-10-CM

## 2025-06-11 DIAGNOSIS — Z00.00 ENCOUNTER FOR ANNUAL PHYSICAL EXAM: ICD-10-CM

## 2025-06-11 DIAGNOSIS — Z13.6 SCREENING FOR CARDIOVASCULAR CONDITION: ICD-10-CM

## 2025-06-11 LAB
ALBUMIN SERPL BCG-MCNC: 4.6 G/DL (ref 3.5–5.2)
ALP SERPL-CCNC: 67 U/L (ref 40–150)
ALT SERPL W P-5'-P-CCNC: 46 U/L (ref 0–70)
ANION GAP SERPL CALCULATED.3IONS-SCNC: 9 MMOL/L (ref 7–15)
AST SERPL W P-5'-P-CCNC: 31 U/L (ref 0–45)
BILIRUB SERPL-MCNC: 0.4 MG/DL
BUN SERPL-MCNC: 13.6 MG/DL (ref 6–20)
CALCIUM SERPL-MCNC: 9.4 MG/DL (ref 8.8–10.4)
CHLORIDE SERPL-SCNC: 104 MMOL/L (ref 98–107)
CHOLEST SERPL-MCNC: 162 MG/DL
CREAT SERPL-MCNC: 0.93 MG/DL (ref 0.67–1.17)
EGFRCR SERPLBLD CKD-EPI 2021: >90 ML/MIN/1.73M2
ERYTHROCYTE [DISTWIDTH] IN BLOOD BY AUTOMATED COUNT: 14.4 % (ref 10–15)
EST. AVERAGE GLUCOSE BLD GHB EST-MCNC: 108 MG/DL
FASTING STATUS PATIENT QL REPORTED: YES
FASTING STATUS PATIENT QL REPORTED: YES
GLUCOSE SERPL-MCNC: 128 MG/DL (ref 70–99)
HBA1C MFR BLD: 5.4 % (ref 0–5.6)
HCO3 SERPL-SCNC: 28 MMOL/L (ref 22–29)
HCT VFR BLD AUTO: 42.4 % (ref 40–53)
HDLC SERPL-MCNC: 51 MG/DL
HGB BLD-MCNC: 14.7 G/DL (ref 13.3–17.7)
LDLC SERPL CALC-MCNC: 98 MG/DL
MCH RBC QN AUTO: 28.2 PG (ref 26.5–33)
MCHC RBC AUTO-ENTMCNC: 34.7 G/DL (ref 31.5–36.5)
MCV RBC AUTO: 81 FL (ref 78–100)
NONHDLC SERPL-MCNC: 111 MG/DL
PLATELET # BLD AUTO: 188 10E3/UL (ref 150–450)
POTASSIUM SERPL-SCNC: 4.7 MMOL/L (ref 3.4–5.3)
PROT SERPL-MCNC: 7.5 G/DL (ref 6.4–8.3)
RBC # BLD AUTO: 5.21 10E6/UL (ref 4.4–5.9)
SODIUM SERPL-SCNC: 141 MMOL/L (ref 135–145)
TRIGL SERPL-MCNC: 66 MG/DL
WBC # BLD AUTO: 5.5 10E3/UL (ref 4–11)

## 2025-06-11 PROCEDURE — 85027 COMPLETE CBC AUTOMATED: CPT

## 2025-06-11 PROCEDURE — 80061 LIPID PANEL: CPT

## 2025-06-11 PROCEDURE — 36415 COLL VENOUS BLD VENIPUNCTURE: CPT

## 2025-06-11 PROCEDURE — 83036 HEMOGLOBIN GLYCOSYLATED A1C: CPT

## 2025-06-11 PROCEDURE — 80053 COMPREHEN METABOLIC PANEL: CPT

## 2025-06-12 ENCOUNTER — PATIENT OUTREACH (OUTPATIENT)
Dept: CARE COORDINATION | Facility: CLINIC | Age: 35
End: 2025-06-12
Payer: COMMERCIAL